# Patient Record
Sex: MALE | Race: WHITE | Employment: FULL TIME | URBAN - METROPOLITAN AREA
[De-identification: names, ages, dates, MRNs, and addresses within clinical notes are randomized per-mention and may not be internally consistent; named-entity substitution may affect disease eponyms.]

---

## 2017-01-19 DIAGNOSIS — N52.9 ED (ERECTILE DYSFUNCTION): ICD-10-CM

## 2017-01-20 RX ORDER — TADALAFIL 20 MG
TABLET ORAL
Qty: 18 TABLET | Refills: 1 | Status: SHIPPED | OUTPATIENT
Start: 2017-01-20 | End: 2018-06-11 | Stop reason: SDUPTHER

## 2017-01-20 RX ORDER — NAPROXEN 500 MG/1
TABLET ORAL
Qty: 60 TABLET | Refills: 1 | Status: SHIPPED | OUTPATIENT
Start: 2017-01-20

## 2017-02-02 ENCOUNTER — OFFICE VISIT (OUTPATIENT)
Dept: INTERNAL MEDICINE CLINIC | Age: 52
End: 2017-02-02

## 2017-02-02 VITALS
WEIGHT: 228 LBS | OXYGEN SATURATION: 99 % | SYSTOLIC BLOOD PRESSURE: 150 MMHG | DIASTOLIC BLOOD PRESSURE: 90 MMHG | BODY MASS INDEX: 30.08 KG/M2 | HEART RATE: 72 BPM

## 2017-02-02 DIAGNOSIS — E78.00 PURE HYPERCHOLESTEROLEMIA: ICD-10-CM

## 2017-02-02 DIAGNOSIS — I10 ESSENTIAL HYPERTENSION: Primary | ICD-10-CM

## 2017-02-02 PROCEDURE — 99213 OFFICE O/P EST LOW 20 MIN: CPT | Performed by: INTERNAL MEDICINE

## 2017-02-02 RX ORDER — SPIRONOLACTONE 50 MG/1
50 TABLET, FILM COATED ORAL DAILY
Qty: 90 TABLET | Refills: 2 | Status: SHIPPED | OUTPATIENT
Start: 2017-02-02 | End: 2018-01-31 | Stop reason: SDUPTHER

## 2017-02-02 RX ORDER — VALSARTAN AND HYDROCHLOROTHIAZIDE 320; 25 MG/1; MG/1
1 TABLET, FILM COATED ORAL DAILY
Qty: 90 TABLET | Refills: 3 | Status: SHIPPED | OUTPATIENT
Start: 2017-02-02 | End: 2018-06-11 | Stop reason: SDUPTHER

## 2017-02-02 RX ORDER — SIMVASTATIN 20 MG
20 TABLET ORAL EVERY EVENING
Qty: 90 TABLET | Refills: 3 | Status: SHIPPED | OUTPATIENT
Start: 2017-02-02 | End: 2018-06-11 | Stop reason: SDUPTHER

## 2017-02-02 RX ORDER — AMLODIPINE BESYLATE 10 MG/1
10 TABLET ORAL DAILY
Qty: 90 TABLET | Refills: 3 | Status: SHIPPED | OUTPATIENT
Start: 2017-02-02 | End: 2018-06-11 | Stop reason: SDUPTHER

## 2017-02-20 DIAGNOSIS — N52.9 ED (ERECTILE DYSFUNCTION): ICD-10-CM

## 2017-02-20 RX ORDER — TADALAFIL 20 MG
TABLET ORAL
Qty: 18 TABLET | Refills: 1 | Status: SHIPPED | OUTPATIENT
Start: 2017-02-20 | End: 2017-11-17 | Stop reason: SDUPTHER

## 2017-05-04 ENCOUNTER — OFFICE VISIT (OUTPATIENT)
Dept: INTERNAL MEDICINE CLINIC | Age: 52
End: 2017-05-04

## 2017-05-04 VITALS
HEART RATE: 63 BPM | SYSTOLIC BLOOD PRESSURE: 134 MMHG | WEIGHT: 244 LBS | OXYGEN SATURATION: 99 % | BODY MASS INDEX: 32.19 KG/M2 | DIASTOLIC BLOOD PRESSURE: 86 MMHG

## 2017-05-04 DIAGNOSIS — N52.02 CORPORO-VENOUS OCCLUSIVE ERECTILE DYSFUNCTION: ICD-10-CM

## 2017-05-04 DIAGNOSIS — E78.00 PURE HYPERCHOLESTEROLEMIA: ICD-10-CM

## 2017-05-04 DIAGNOSIS — I10 ESSENTIAL HYPERTENSION: Primary | ICD-10-CM

## 2017-05-04 PROCEDURE — 99214 OFFICE O/P EST MOD 30 MIN: CPT | Performed by: INTERNAL MEDICINE

## 2017-05-04 ASSESSMENT — ENCOUNTER SYMPTOMS
PHOTOPHOBIA: 0
EYE REDNESS: 0
CHOKING: 0
COUGH: 0

## 2017-09-07 ENCOUNTER — OFFICE VISIT (OUTPATIENT)
Dept: INTERNAL MEDICINE CLINIC | Age: 52
End: 2017-09-07

## 2017-09-07 VITALS
SYSTOLIC BLOOD PRESSURE: 134 MMHG | DIASTOLIC BLOOD PRESSURE: 86 MMHG | BODY MASS INDEX: 32.06 KG/M2 | WEIGHT: 243 LBS | OXYGEN SATURATION: 98 % | HEART RATE: 65 BPM

## 2017-09-07 DIAGNOSIS — Z12.5 SCREENING FOR PROSTATE CANCER: ICD-10-CM

## 2017-09-07 DIAGNOSIS — Z00.00 WELL ADULT EXAM: Primary | ICD-10-CM

## 2017-09-07 PROCEDURE — 99396 PREV VISIT EST AGE 40-64: CPT | Performed by: INTERNAL MEDICINE

## 2017-09-07 RX ORDER — AMOXICILLIN 875 MG/1
875 TABLET, COATED ORAL 2 TIMES DAILY
Qty: 20 TABLET | Refills: 0 | Status: SHIPPED | OUTPATIENT
Start: 2017-09-07 | End: 2017-09-07 | Stop reason: ALTCHOICE

## 2017-09-07 RX ORDER — AMOXICILLIN 875 MG/1
875 TABLET, COATED ORAL 2 TIMES DAILY
Qty: 20 TABLET | Refills: 0 | Status: SHIPPED | OUTPATIENT
Start: 2017-09-07 | End: 2017-09-17

## 2017-09-07 RX ORDER — AMOXICILLIN 875 MG/1
875 TABLET, COATED ORAL 2 TIMES DAILY
Qty: 20 TABLET | Refills: 0 | Status: SHIPPED | OUTPATIENT
Start: 2017-09-07 | End: 2017-09-07 | Stop reason: SDUPTHER

## 2017-09-11 DIAGNOSIS — I10 ESSENTIAL HYPERTENSION: ICD-10-CM

## 2017-09-11 DIAGNOSIS — Z12.5 SCREENING FOR PROSTATE CANCER: ICD-10-CM

## 2017-09-11 DIAGNOSIS — E78.00 PURE HYPERCHOLESTEROLEMIA: ICD-10-CM

## 2017-09-11 LAB
A/G RATIO: 1.8 (ref 1.1–2.2)
ALBUMIN SERPL-MCNC: 4.4 G/DL (ref 3.4–5)
ALP BLD-CCNC: 83 U/L (ref 40–129)
ALT SERPL-CCNC: 30 U/L (ref 10–40)
ANION GAP SERPL CALCULATED.3IONS-SCNC: 16 MMOL/L (ref 3–16)
AST SERPL-CCNC: 28 U/L (ref 15–37)
BILIRUB SERPL-MCNC: 0.5 MG/DL (ref 0–1)
BUN BLDV-MCNC: 18 MG/DL (ref 7–20)
CALCIUM SERPL-MCNC: 9.5 MG/DL (ref 8.3–10.6)
CHLORIDE BLD-SCNC: 101 MMOL/L (ref 99–110)
CHOLESTEROL, TOTAL: 172 MG/DL (ref 0–199)
CO2: 23 MMOL/L (ref 21–32)
CREAT SERPL-MCNC: 0.8 MG/DL (ref 0.9–1.3)
GFR AFRICAN AMERICAN: >60
GFR NON-AFRICAN AMERICAN: >60
GLOBULIN: 2.5 G/DL
GLUCOSE BLD-MCNC: 104 MG/DL (ref 70–99)
HDLC SERPL-MCNC: 45 MG/DL (ref 40–60)
LDL CHOLESTEROL CALCULATED: 108 MG/DL
POTASSIUM SERPL-SCNC: 4.2 MMOL/L (ref 3.5–5.1)
PROSTATE SPECIFIC ANTIGEN: 1.37 NG/ML (ref 0–4)
SODIUM BLD-SCNC: 140 MMOL/L (ref 136–145)
TOTAL PROTEIN: 6.9 G/DL (ref 6.4–8.2)
TRIGL SERPL-MCNC: 94 MG/DL (ref 0–150)
VLDLC SERPL CALC-MCNC: 19 MG/DL

## 2017-11-17 DIAGNOSIS — N52.9 ED (ERECTILE DYSFUNCTION): ICD-10-CM

## 2017-11-20 RX ORDER — TADALAFIL 20 MG
TABLET ORAL
Qty: 18 TABLET | Refills: 1 | Status: SHIPPED | OUTPATIENT
Start: 2017-11-20

## 2018-01-31 DIAGNOSIS — I10 ESSENTIAL HYPERTENSION: ICD-10-CM

## 2018-02-01 RX ORDER — SPIRONOLACTONE 50 MG/1
TABLET, FILM COATED ORAL
Qty: 90 TABLET | Refills: 2 | Status: SHIPPED | OUTPATIENT
Start: 2018-02-01 | End: 2018-06-11 | Stop reason: SDUPTHER

## 2018-06-11 DIAGNOSIS — N52.9 ED (ERECTILE DYSFUNCTION): ICD-10-CM

## 2018-06-11 DIAGNOSIS — I10 ESSENTIAL HYPERTENSION: ICD-10-CM

## 2018-06-11 DIAGNOSIS — E78.00 PURE HYPERCHOLESTEROLEMIA: ICD-10-CM

## 2018-06-11 RX ORDER — AMLODIPINE BESYLATE 10 MG/1
10 TABLET ORAL DAILY
Qty: 90 TABLET | Refills: 0 | Status: SHIPPED | OUTPATIENT
Start: 2018-06-11 | End: 2018-08-10 | Stop reason: SDUPTHER

## 2018-06-11 RX ORDER — VALSARTAN AND HYDROCHLOROTHIAZIDE 320; 25 MG/1; MG/1
1 TABLET, FILM COATED ORAL DAILY
Qty: 90 TABLET | Refills: 0 | Status: SHIPPED | OUTPATIENT
Start: 2018-06-11 | End: 2018-08-10 | Stop reason: SDUPTHER

## 2018-06-11 RX ORDER — SPIRONOLACTONE 50 MG/1
50 TABLET, FILM COATED ORAL DAILY
Qty: 90 TABLET | Refills: 2 | Status: SHIPPED | OUTPATIENT
Start: 2018-06-11

## 2018-06-11 RX ORDER — SIMVASTATIN 20 MG
20 TABLET ORAL EVERY EVENING
Qty: 90 TABLET | Refills: 0 | Status: SHIPPED | OUTPATIENT
Start: 2018-06-11 | End: 2018-08-20 | Stop reason: SDUPTHER

## 2018-06-11 RX ORDER — TADALAFIL 20 MG/1
20 TABLET ORAL PRN
Qty: 9 TABLET | Refills: 0 | Status: SHIPPED | OUTPATIENT
Start: 2018-06-11 | End: 2018-08-10 | Stop reason: SDUPTHER

## 2018-07-12 ENCOUNTER — OFFICE VISIT (OUTPATIENT)
Dept: INTERNAL MEDICINE CLINIC | Age: 53
End: 2018-07-12

## 2018-07-12 VITALS
HEART RATE: 67 BPM | BODY MASS INDEX: 32.85 KG/M2 | OXYGEN SATURATION: 97 % | DIASTOLIC BLOOD PRESSURE: 80 MMHG | TEMPERATURE: 98.2 F | SYSTOLIC BLOOD PRESSURE: 138 MMHG | WEIGHT: 249 LBS

## 2018-07-12 DIAGNOSIS — J30.89 ENVIRONMENTAL AND SEASONAL ALLERGIES: Primary | ICD-10-CM

## 2018-07-12 PROCEDURE — 99213 OFFICE O/P EST LOW 20 MIN: CPT | Performed by: NURSE PRACTITIONER

## 2018-07-12 RX ORDER — LEVOCETIRIZINE DIHYDROCHLORIDE 5 MG/1
5 TABLET, FILM COATED ORAL NIGHTLY
Qty: 30 TABLET | Refills: 1 | Status: SHIPPED | OUTPATIENT
Start: 2018-07-12 | End: 2018-09-29 | Stop reason: SDUPTHER

## 2018-07-12 ASSESSMENT — PATIENT HEALTH QUESTIONNAIRE - PHQ9
1. LITTLE INTEREST OR PLEASURE IN DOING THINGS: 0
2. FEELING DOWN, DEPRESSED OR HOPELESS: 0
SUM OF ALL RESPONSES TO PHQ QUESTIONS 1-9: 0
SUM OF ALL RESPONSES TO PHQ9 QUESTIONS 1 & 2: 0

## 2018-07-12 ASSESSMENT — ENCOUNTER SYMPTOMS
SHORTNESS OF BREATH: 1
COUGH: 1

## 2018-07-12 NOTE — PATIENT INSTRUCTIONS
Continue to drink plenty of water  Inhale hot steam for 5 minutes then gargle with warm salt and water  Take allergy pill at night  Vacuum and dust daily wear hardwood floors have been

## 2018-08-10 DIAGNOSIS — N52.9 ED (ERECTILE DYSFUNCTION): ICD-10-CM

## 2018-08-10 RX ORDER — AMLODIPINE BESYLATE 10 MG/1
TABLET ORAL
Qty: 90 TABLET | Refills: 0 | Status: SHIPPED | OUTPATIENT
Start: 2018-08-10 | End: 2018-10-30 | Stop reason: SDUPTHER

## 2018-08-10 RX ORDER — VALSARTAN AND HYDROCHLOROTHIAZIDE 320; 25 MG/1; MG/1
TABLET, FILM COATED ORAL
Qty: 90 TABLET | Refills: 0 | Status: SHIPPED | OUTPATIENT
Start: 2018-08-10

## 2018-08-10 RX ORDER — TADALAFIL 20 MG
TABLET ORAL
Qty: 9 TABLET | Refills: 0 | Status: SHIPPED | OUTPATIENT
Start: 2018-08-10 | End: 2020-03-05 | Stop reason: SDUPTHER

## 2018-08-20 ENCOUNTER — TELEPHONE (OUTPATIENT)
Dept: INTERNAL MEDICINE CLINIC | Age: 53
End: 2018-08-20

## 2018-08-20 DIAGNOSIS — E78.00 PURE HYPERCHOLESTEROLEMIA: ICD-10-CM

## 2018-08-20 RX ORDER — SIMVASTATIN 20 MG
20 TABLET ORAL EVERY EVENING
Qty: 90 TABLET | Refills: 0 | Status: SHIPPED | OUTPATIENT
Start: 2018-08-20 | End: 2020-03-05

## 2018-08-20 NOTE — TELEPHONE ENCOUNTER
I sent a prescription to pharmacy. Patient does need an appointment as he has not been seen in almost a year.

## 2018-09-29 DIAGNOSIS — J30.89 ENVIRONMENTAL AND SEASONAL ALLERGIES: ICD-10-CM

## 2018-10-01 NOTE — TELEPHONE ENCOUNTER
Pharmacy asking for a refill    Last office visit 7/2018 for acute, 9/2017 for f/u    Next office visit 11/12/18

## 2018-10-02 RX ORDER — LEVOCETIRIZINE DIHYDROCHLORIDE 5 MG/1
TABLET, FILM COATED ORAL
Qty: 30 TABLET | Refills: 1 | Status: SHIPPED | OUTPATIENT
Start: 2018-10-02 | End: 2020-03-05

## 2018-10-03 ENCOUNTER — TELEPHONE (OUTPATIENT)
Dept: INTERNAL MEDICINE CLINIC | Age: 53
End: 2018-10-03

## 2018-10-31 RX ORDER — AMLODIPINE BESYLATE 10 MG/1
10 TABLET ORAL DAILY
Qty: 90 TABLET | Refills: 3 | Status: SHIPPED | OUTPATIENT
Start: 2018-10-31

## 2020-03-05 ENCOUNTER — OFFICE VISIT (OUTPATIENT)
Dept: ORTHOPEDIC SURGERY | Age: 55
End: 2020-03-05
Payer: COMMERCIAL

## 2020-03-05 VITALS
HEART RATE: 60 BPM | WEIGHT: 230 LBS | HEIGHT: 73 IN | SYSTOLIC BLOOD PRESSURE: 156 MMHG | DIASTOLIC BLOOD PRESSURE: 94 MMHG | BODY MASS INDEX: 30.48 KG/M2

## 2020-03-05 PROCEDURE — 99203 OFFICE O/P NEW LOW 30 MIN: CPT | Performed by: PHYSICIAN ASSISTANT

## 2020-03-05 RX ORDER — ROPINIROLE 0.5 MG/1
TABLET, FILM COATED ORAL
COMMUNITY
Start: 2019-12-30

## 2020-03-05 RX ORDER — NABUMETONE 750 MG/1
750 TABLET, FILM COATED ORAL 2 TIMES DAILY
COMMUNITY

## 2020-03-05 NOTE — PROGRESS NOTES
New Patient: SPINE    Referring Provider:  No ref. provider found    Chief Complaint   Patient presents with    Back Pain     NP, TSP       HISTORY OF PRESENT ILLNESS:      · The patient is being sent at the request of No ref. provider found in consultation as a new spine patient for mid-back pain. . The patient is a 63 y.o. male whom reports symptoms for 3 years. Symptoms progressed over the last  6 months. Patient reports there was not a significant event to cause the symptoms. Today discomfort is report at 7 out of 10, describing it as dull, aching, tingling. 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 Patient has undergone recent treatment including, ice, chiropractor a few years ago. Patient denies previous lumbar or cervical spine surgery. · Patient presents today with increased mid back pain throughout his entire mid back. The patient does have occasional lower back and left hip and leg pain which she has been dealing with for a number of years. This mid back pain is been progressively worsening over the last 6 months. Patient describes his pain is a 7/10 in severity. The patient has attempted using ice and some over-the-counter medications which are not helping. He has seen a chiropractor a number of years ago but he is not had any formal physical therapy. The patient did have injections in his lower back 4 years ago and he is not having the intense \"sciatic\" type pain that he was having at this time the mid back is much more severe.     Pain Assessment  Location of Pain: Back(TSP)  Location Modifiers: Posterior  Severity of Pain: 8  Quality of Pain: Sharp, Other (Comment)(Tingling)  Duration of Pain: Persistent  Frequency of Pain: Constant  Aggravating Factors: Walking, Standing, Stretching, Straightening, Other (Comment)(Tending to farm animals)  Limiting Behavior: Yes  Relieving Factors: Rest  Result of Injury: No  Work-Related Injury: No  Are there other pain 1 TABLET DAILY        SCHEDULE AN APPOINTMENT FORFUTURE REFILLS, Disp: 90 tablet, Rfl: 0    spironolactone (ALDACTONE) 50 MG tablet, Take 1 tablet by mouth daily Pt to schedule an appt for future refills, Disp: 90 tablet, Rfl: 2    CIALIS 20 MG tablet, TAKE 1 TABLET AS NEEDED FORERECTILE DYSFUNCTION, Disp: 18 tablet, Rfl: 1    naproxen (NAPROSYN) 500 MG tablet, TAKE 1 TABLET TWICE A DAY  AS NEEDED FOR PAIN, Disp: 60 tablet, Rfl: 1    aspirin 81 MG EC tablet, Take 81 mg by mouth daily. , Disp: , Rfl:   Allergies:  Patient has no known allergies. Social History:    reports that he has never smoked. He has never used smokeless tobacco. He reports current alcohol use of about 0.8 standard drinks of alcohol per week. He reports that he does not use drugs. Family History:   Family History   Problem Relation Age of Onset    Diabetes Mother     High Cholesterol Mother          REVIEW OF SYSTEMS: Full ROS reviewed & scanned from 3/5/2020           PHYSICAL EXAM:    Vitals: Blood pressure (!) 156/94, pulse 60, height 6' 1\" (1.854 m), weight 230 lb (104.3 kg). GENERAL EXAM:  · General Apparence: Patient is adequately groomed with no evidence of malnutrition. · Psychiatric: Orientation: The patient is oriented to time, place and person. The patient's mood and affect are appropriate   · Vascular: Examination reveals no swelling and palpation reveals no tenderness in upper or lower extremities. Good capillary refill. · The lymphatic examination of the neck, axillae and groin reveals all areas to be without enlargement or induration   Sensation is intact without deficit in the upper and lower extremities to light touch and pinprick  · Coordination of the upper and lower extremities are normal.    CERVICAL EXAMINATION:  · Inspection: Local inspection shows no step-off or bruising. Cervical alignment is normal. No instability is noted. · Palpation and Percussion: No evidence of tenderness at the midline.   Paraspinal - 199 mg/dL    Triglycerides 94 0 - 150 mg/dL    HDL 45 40 - 60 mg/dL    LDL Calculated 108 (H) <100 mg/dL    VLDL Cholesterol Calculated 19 Not Established mg/dL   PSA PROSTATIC SPECIFIC ANTIGEN   Result Value Ref Range    PSA 1.37 0.00 - 4.00 ng/mL       Impression (Medical Decision Making):       1. Spondylosis of thoracic region without myelopathy or radiculopathy    2. DDD (degenerative disc disease), thoracic    3. Mid back pain        Plan (Medical Decision Making):    I discussed the diagnosis and the treatment options with Fred Burks today. In Summary:  The various treatment options were outlined and discussed with Fred Burks including:  Conservative care options: physical therapy, ice, medications, bracing, and activity modification. The indications for therapeutic injections. The indications for additional imaging/laboratory studies. The indications for (possible future) interventions. After considering the various options discussed, Fred Burks elected to pursue a course of treatment that includes the followin. Medications: No further recommendations for new medications. 2. PT:  I will start the patient on a trial of PT to work on a thoracic and lumbar stabilization program to focus on core strengthening, core stabilizing, lumbar stretches, hamstring flexibility, modalities as indicated for 6-8 visits over the next 4-6 weeks. 3. Further studies: No further studies. If the patient does not improve with PT, we will proceed with a MRI of the thoracic spine. 4. Interventional:  At this point, no interventional options are recommended.     5. Healthy Lifestyle Measures:  Patient education material reviewing the following was distributed to [place] [facility]mie  Anatomic drawings  Healthy lifestyle education  Osteoporosis prevention,   Back and neck pain educational information   Advanced imaging preparedness    Posture education   Proper lifting and carrying techniques,   Weight

## 2020-03-05 NOTE — LETTER
PRESCRIPTION FOR PHYSICAL THERAPY  Patient Name: Machelle Barbour MRN: [id number]  DOS: 3/5/2020   Diagnosis:   1. Spondylosis of thoracic region without myelopathy or radiculopathy    2. DDD (degenerative disc disease), thoracic    3. Mid back pain                           Goal:  [x]Decrease Pain and/or Swelling [x]Increase ROM and/or Flexibility     [x]Increase Function                        [x]Increase Strength and/or Endurance   Evaluation:  [x]Evaluation and Treatment []KT-1000   []Isokinetic Exam       Recommended Modalities:  [x]Modalities of Choice      []HCVS            [x]Electrical Stimulation       []Ultrasound        []TENS/TNS    [] Lumbar Traction           [] Cervical Traction   []Phonophoresis         [x]Hot Pack/Cold Pack  []Other:  Therapeutic Exercises:    []Isometrics    [x]Range of Motion   [x]Balance Coordination   []Flexibility    [x]Back Extension/Flexion Exercises  []Passive     [x]Dynamic Lumbar Stabilization  []Active Assisted    [x]Myofascial Release/Soft Tissue  [x]Active            [x]Spine Program [x] Gait                                                                   [x] Extension   [] Cervical Eval        [x] Stabilization  [x] Lumbar           [x] Spine Eval   [x] Lumbar Exer.   [] Stationary Bike   [] Functional Cap    [] Aquatic Prog.   [] Return to work     Treatment Program:  [x] Other: [facility] [address] (6) VISITS - Patient will need to follow up in the office after visit #6    *[address] (I.e.: [facility], [facility], etc.)           ARIEL Brice, PARhinaC  Board Certified by the [employer] on Certification of Physician Assistants  Lupe Maddox 57  Partner of [employer]

## 2020-05-26 ENCOUNTER — HOSPITAL ENCOUNTER (OUTPATIENT)
Dept: PHYSICAL THERAPY | Age: 55
Setting detail: THERAPIES SERIES
Discharge: HOME | End: 2020-05-26
Payer: COMMERCIAL

## 2020-05-26 PROCEDURE — 97161 PT EVAL LOW COMPLEX 20 MIN: CPT

## 2020-05-26 PROCEDURE — 97110 THERAPEUTIC EXERCISES: CPT

## 2020-05-26 PROCEDURE — 97530 THERAPEUTIC ACTIVITIES: CPT

## 2020-05-26 NOTE — FLOWSHEET NOTE
in  [] LE / lumbar: LE, proximal hip, and core control in self care, mobility, lifting, ambulation and eccentric single leg control. [] UE / cervical: cervical, scapular, scapulothoracic and UE control with self care, reaching, carrying, lifting, house/yardwork, driving, computer work. NMR and Therapeutic Activities:    [] (13374 or 59349) Provided verbal/tactile cueing for activities related to improving balance, coordination, kinesthetic sense, posture, motor skill, proprioception and motor activation to allow for proper function of   [] LE: / Lumbar core, proximal hip and LE with self care and ADLs  [] UE / Cervical: cervical, postural, scapular, scapulothoracic and UE control with self care, carrying, lifting, driving, computer work.   [] (07344) Gait Re-education- Provided training and instruction to the patient for proper LE, core and proximal hip recruitment and positioning and eccentric body weight control with ambulation re-education including up and down stairs     Home Management Training / Self Care:  [] (99927) Provided self-care/home management training related to activities of daily living and compensatory training, and/or use of adaptive equipment for improvement with: ADLs and compensatory training, meal preparation, safety procedures and instruction in use of adaptive equipment, including bathing, grooming, dressing, personal hygiene, basic household cleaning and chores.      Home Exercise Program:    [x] (55345) Reviewed/Progressed HEP activities related to strengthening, flexibility, endurance, ROM of   [] LE / Lumbar: core, proximal hip and LE for functional self-care, mobility, lifting and ambulation/stair navigation   [] UE / Cervical: cervical, postural, scapular, scapulothoracic and UE control with self care, reaching, carrying, lifting, house/yardwork, driving, computer work  [] (71212)Reviewed/Progressed HEP activities related to improving balance, coordination, kinesthetic sense, To be achieved in: 2 weeks  1. Independent in HEP and progression per patient tolerance, in order to prevent re-injury. []? Progressing: []? Met: []? Not Met: []? Adjusted  2. Patient will have a decrease in pain to facilitate improvement in movement, function, and ADLs as indicated by Functional Deficits. []? Progressing: []? Met: []? Not Met: []? Adjusted     Long Term Goals: To be achieved in:  weeks  1. Disability index score of 20% or less for the DENAE to assist with reaching prior level of function. []? Progressing: []? Met: []? Not Met: []? Adjusted  2. Patient will demonstrate increased AROM to WNL, good LS mobility, good hip ROM to allow for proper joint functioning as indicated by patients Functional Deficits. []? Progressing: []? Met: []? Not Met: []? Adjusted  3. Patient will demonstrate an increase in Strength to 4+/5 hip good proximal hip and core activation to allow for proper functional mobility as indicated by patients Functional Deficits. []? Progressing: []? Met: []? Not Met: []? Adjusted  4. 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 []? Progressing: []? Met: []? Not Met: []? Adjusted  5. Pt will demonstrate ability to ambulate > 30 mins continuously without onset of symptoms    []? Progressing: []? Met: []? Not Met: []? Adjusted      Overall Progression Towards Functional goals/ Treatment Progress Update:  [] Patient is progressing as expected towards functional goals listed. [] Progression is slowed due to complexities/Impairments listed. [] Progression has been slowed due to co-morbidities.   [x] Plan just implemented, too soon to assess goals progression <30days   [] Goals require adjustment due to lack of progress  [] Patient is not progressing as expected and requires additional follow up with physician  [] Other    Persisting Functional

## 2020-05-26 NOTE — PLAN OF CARE
[address] [address], [address]  Phone: [phone]   Fax: [phone]     Physical Therapy Certification    Dear Referring Practitioner: MILAGROS Perales,    We had the pleasure of evaluating the following patient for physical therapy services at [address] [place] [address]. A summary of our findings can be found in the initial assessment below. This includes our plan of care. If you have any questions or concerns regarding these findings, please do not hesitate to contact me at the office phone number checked above. Thank you for the referral.       Physician Signature:_______________________________Date:__________________  By signing above (or electronic signature), therapists plan is approved by physician      Patient: Alan Check   : 1965   MRN: [id number]  Referring Physician: Referring Practitioner: MILAGORS Perales      Evaluation Date: 2020      Medical Diagnosis Information:  Diagnosis: Thoracic Spondylosis, Thoracic DDD, Mid back pain    Treatment Diagnosis: Impaired posture, decreased thoracic stability, decreased left hip strength/flexibility, sciatic nerve irritation                                         Insurance information: PT Insurance Information: [place] 60 visits/year     Precautions/ Contra-indications:   Latex Allergy:  [x]NO      []YES  Preferred Language for Healthcare:   [x]English       []other:    SUBJECTIVE: Patient stated complaint:Pt referred for mid back pain. Also has c/o pain in left hip. Symptoms for 3 years. Pain 7/10, dull, aching, tingling. Aggravated by movement, walking, work ( and small farm). Also has occasional lower back and left hip and leg pain which he has been dealing with for a number of years. Worsening over the last 6 months. Had chiropractic care a number of years ago, no formal PT. Had injections in low back 4 years ago.   Says pain in between shoulder blades will get so bad it will almost take his hip away. 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 Avoids real strenous activities. Is still able to carry a 50# feed bag. Pt has tried exercise, and says if he rides his horse his sciatic pain goes away. MD wants 6 visits only, then needs to follow back up with MD     Fear avoidance: I should not do physical activities that (might) make my pain worse   [] True   [x] False     Relevant Medical History: HTN, Sleep Apnea   Functional Outcome: Oswestry: raw score = 12; dysfunction = 24%    Pain Scale: 2-8/10  Easing factors: Sitting in comfortable chair with feet elevated, lying on floor. Provocative factors:  Repetitive activities    Type: [x]Constant   []Intermittent  [x]Radiating []Localized []other:     Numbness/Tingling: occasional in the left leg, but very infrequent     Occupation/School:      Living Status/Prior Level of Function: Prior to this injury / incident, pt was independent with ADLs and IADLs, enjoys horseback riding. OBJECTIVE:   Palpation: positive TTP with P/A glides T8-L2    Functional Mobility/Transfers:     Posture:      Inspection:     Gait: (include devices/WB status)     Bandages/Dressings/Incisions: NA        Reflexes Normal Abnormal Comments   S1-2 Seated achilles      S1-2 Prone knee bend      L3-4 Patellar tendon      Clonus      Babinski          ROM  Comments   Lumbar Flex 60 With pain    Lumbar Ext WNL      ROM LEFT RIGHT Comments   Lumbar Side Bend 75% 75% With tightness/discomfort    Lumbar Rotation [facility] WFL    Hip Flexion [facility] WFL    Hip Abd [facility]    Hip ER [facility]    Hip IR      Hip Extension      Knee Ext      Knee Flex      Hamstring Flex      Piriformis      Cervical Flex      Cervical Ext      Cervical SB      Cervical Rotation       Shoulder    WFL/WNL but pain with end range flexion and ER in lower scapula B        Joint mobility:  []Normal    [x]Hypo   []Hyper      Strength / Myotomes LEFT RIGHT Comments   Multifidus      Lower Trap 4 4    Hip Flexors (L1-2)      Hip Abductors 4+ 4+    Hip Extensors 4 with pain     Hip Internal Rotators 4+ 4+    Hip External Rotators 4+ 4+    Quads (L2-4) 5 5    Hamstrings  5 5    Ankle Plantarflexion (S1-2) 5 5    Ankle Dorsiflexion (L4-5) 5 5    Shoulder Flex 5 5    Shoulder Abd 5 5    Shoulder ER 5 5    Shoulder IR 5 5    Biceps 5 5    Triceps  5 5            Neural dynamic tension testing Normal Abnormal Comments   Slump Test  - Degree of knee flexion:  x     SLR       0-30  x    30-70  x    Femoral nerve (L2-4)          Orthopedic Special Tests:    Normal Abnormal N/A Comments   Toe walk   x      Heel Walk x      Fwd Bend-aberrant or innominate mvmt)  x     Trendelenburg x      Kemps/Quadrant x      Stork       DONELL/Polo x      Hip scour       SLR  x     Crossed SLR  x     Supine to sit       Hip thrust       SI distraction/compression       PA/Spring       Prone Instability test       Prone knee bend       Sacral Spring/thrust                  [x] Patient history, allergies, meds reviewed. Medical chart reviewed. See intake form. Review Of Systems (ROS):  [x]Performed Review of systems (Integumentary, CardioPulmonary, Neurological) by intake and observation. Intake form has been scanned into medical record. Patient has been instructed to contact their primary care physician regarding ROS issues if not already being addressed at this time.       Co-morbidities/Complexities (which will affect course of rehabilitation):   [x]None           Arthritic conditions   []Rheumatoid arthritis (M05.9)  [x]Osteoarthritis (M19.91)   Cardiovascular conditions   []Hypertension (I10)  []Hyperlipidemia (E78.5)  []Angina pectoris (I20)  []Atherosclerosis (I70)   Musculoskeletal care  []3 personal factors and/or comorbidities that impact the plan of care  [x] An examination of body systems using standardized tests and measures addressing any of the following: body structures and functions (impairments), activity limitations, and/or participation restrictions;:  [x] a total of 1-2 or more elements   [] a total of 3 or more elements   [] a total of 4 or more elements   [x] A clinical presentation with:  [x] stable and/or uncomplicated characteristics   [] evolving clinical presentation with changing characteristics  [] unstable and unpredictable characteristics;   [x] Clinical decision making of [] low, [] moderate, [] high complexity using standardized patient assessment instrument and/or measurable assessment of functional outcome. [x] EVAL (LOW) 88567 (typically 15 minutes face-to-face)  [] EVAL (MOD) 15464 (typically 30 minutes face-to-face)  [] EVAL (HIGH) 83474 (typically 45 minutes face-to-face)  [] RE-EVAL     PLAN: Begin PT focusing on: proximal hip mobilizations, LB mobs, LB core activation, proximal hip activation, and HEP    Frequency/Duration:   1 days per week for 5 Weeks:  Interventions:  [x]  Therapeutic exercise including: strength training, ROM, for LE, Glutes and core   [x]  NMR activation and proprioception for glutes , LE and Core   [x]  Manual therapy as indicated for Hip complex, LE and spine to include: Dry Needling/IASTM, STM, PROM, Gr I-IV mobilizations, manipulation. [x]  Modalities as needed that may include: thermal agents, E-stim, Biofeedback, US, iontophoresis as indicated  [x]  Patient education on joint protection, postural re-education, activity modification, progression of HEP. HEP instruction: Written HEP instructions provided and reviewed:      Access Code: [email or web address]   URL: [email or web address]   Date: 05/26/2020   Prepared by: Olga Marin     Exercises   Static Prone on Elbows - 2-5 mins hold - 2x daily - 7x weekly   Prone Press Up - 10 reps - 2-3 sets - 2x daily - 7x weekly   Supine Piriformis Stretch with Foot on Ground - 5 reps - 20-30 secs hold - 2x daily - 7x weekly   Supine Mid-Thoracic Mobilization - 25 mins hold - 2x daily - 7x weekly   Standing Shoulder Row with Anchored Resistance - 10 reps - 2-3 sets - 2x daily - 7x weekly   Standing High Row with Resistance - 10 reps - 2-3 sets - 2x daily - 7x weekly       GOALS:  Patient stated goal: reduce pain, increase activity level   [] Progressing: [] Met: [] Not Met: [] Adjusted    Therapist goals for Patient:   Short Term Goals: To be achieved in: 2 weeks  1. Independent in HEP and progression per patient tolerance, in order to prevent re-injury. [] Progressing: [] Met: [] Not Met: [] Adjusted  2. Patient will have a decrease in pain to facilitate improvement in movement, function, and ADLs as indicated by Functional Deficits. [] Progressing: [] Met: [] Not Met: [] Adjusted    Long Term Goals: To be achieved in:  weeks  1. Disability index score of 20% or less for the DENAE to assist with reaching prior level of function. [] Progressing: [] Met: [] Not Met: [] Adjusted  2. Patient will demonstrate increased AROM to WNL, good LS mobility, good hip ROM to allow for proper joint functioning as indicated by patients Functional Deficits. [] Progressing: [] Met: [] Not Met: [] Adjusted  3. Patient will demonstrate an increase in Strength to 4+/5 hip good proximal hip and core activation to allow for proper functional mobility as indicated by patients Functional Deficits. [] Progressing: [] Met: [] Not Met: [] Adjusted  4. 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 [] Progressing: [] Met: [] Not Met: [] Adjusted  5.  Pt will demonstrate ability to ambulate > 30 mins continuously without onset of symptoms    [] Progressing: [] Met: [] Not Met: []

## 2020-06-01 ENCOUNTER — HOSPITAL ENCOUNTER (OUTPATIENT)
Dept: PHYSICAL THERAPY | Age: 55
Setting detail: THERAPIES SERIES
Discharge: HOME | End: 2020-06-01
Payer: COMMERCIAL

## 2020-06-01 PROCEDURE — 97112 NEUROMUSCULAR REEDUCATION: CPT

## 2020-06-01 PROCEDURE — 97110 THERAPEUTIC EXERCISES: CPT

## 2020-06-01 PROCEDURE — G0283 ELEC STIM OTHER THAN WOUND: HCPCS

## 2020-06-01 PROCEDURE — 97140 MANUAL THERAPY 1/> REGIONS: CPT

## 2020-06-01 NOTE — FLOWSHEET NOTE
RE-EVAL (10813)  [x] LC(32280) x 1      [] Ionto  [x] NMR (55037) x 1      [] Vaso  [x] Manual (68776) x  1     [] Ultrasound  [] TA x  2      [] Mech Traction (81790)  [] Aquatic Therapy x      [x] ES (un) (37028):   [] Home Management Training x  [] ES(attended) (81979)   [] Dry Needling 1-2 muscles (78350):  [] Dry Needling 3+ muscles (965926)  [] Group:      [] Other:     GOALS: Patient stated goal: reduce pain, increase activity level   []? Progressing: []? Met: []? Not Met: []? Adjusted     Therapist goals for Patient:   Short Term Goals: To be achieved in: 2 weeks  1. Independent in HEP and progression per patient tolerance, in order to prevent re-injury. []? Progressing: []? Met: []? Not Met: []? Adjusted  2. Patient will have a decrease in pain to facilitate improvement in movement, function, and ADLs as indicated by Functional Deficits. []? Progressing: []? Met: []? Not Met: []? Adjusted     Long Term Goals: To be achieved in:  weeks  1. Disability index score of 20% or less for the DENAE to assist with reaching prior level of function. []? Progressing: []? Met: []? Not Met: []? Adjusted  2. Patient will demonstrate increased AROM to WNL, good LS mobility, good hip ROM to allow for proper joint functioning as indicated by patients Functional Deficits. []? Progressing: []? Met: []? Not Met: []? Adjusted  3. Patient will demonstrate an increase in Strength to 4+/5 hip good proximal hip and core activation to allow for proper functional mobility as indicated by patients Functional Deficits. []? Progressing: []? Met: []? Not Met: []? Adjusted  4. 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 []? Progressing: []? Met: []? Not Met: []? Adjusted  5. Pt will demonstrate ability to ambulate > 30 mins continuously without onset of symptoms    []? Progressing: []? Met: []?  Not Met:

## 2020-06-09 ENCOUNTER — HOSPITAL ENCOUNTER (OUTPATIENT)
Dept: PHYSICAL THERAPY | Age: 55
Setting detail: THERAPIES SERIES
Discharge: HOME | End: 2020-06-09
Payer: COMMERCIAL

## 2020-06-09 PROCEDURE — 97112 NEUROMUSCULAR REEDUCATION: CPT

## 2020-06-09 PROCEDURE — 97110 THERAPEUTIC EXERCISES: CPT

## 2020-06-09 PROCEDURE — 97140 MANUAL THERAPY 1/> REGIONS: CPT

## 2020-06-09 NOTE — FLOWSHEET NOTE
[address] [facility] Physical Therapy  Phone: [phone]   Fax: [phone]    Physical Therapy Daily Treatment Note  Date:  2020    Patient Name:  Merline Duran    :  1965  MRN: [id number]  Medical/Treatment Diagnosis Information:  · Diagnosis: Thoracic Spondylosis, Thoracic DDD, Mid back pain   · Treatment Diagnosis: Impaired posture, decreased thoracic stability, decreased left hip strength/flexibility, sciatic nerve irritation  Insurance/Certification information:  PT Insurance Information: [place] 60 visits/year  Physician Information:  Referring Practitioner: MILAGROS Negro  Plan of care signed (Y/N): []  Yes [x]  No     Date of Patient follow up with Physician:      Progress Report: []  Yes  [x]  No     Date Range for reporting period:  Beginnin/26    Ending:     Progress report due (10 Rx/or 30 days whichever is less): visit #60     Recertification due (POC duration/ or 90 days whichever is less):     Visit # Insurance Allowable Auth required? Date Range    61, but after 6 needs f/u with MD per MD order  []  Yes  [x]  No      Latex Allergy:  [x]NO      []YES  Preferred Language for Healthcare:   [x]English       []other:    Functional Scale:        Date assessed:  LEFS: raw score = 12/80; dysfunction = 24%       Pain level:    3/10 middle back    SUBJECTIVE:   Feel about the same overall, the bad pain doesn't seem to be as much, be less bothersome at times, but still there      OBJECTIVE:   .   Pt 16 mins late for appt, called early in day to state he worked till 3 pm as  at [employer] and his appt was at 3 pm, so was aware ahead of time he would be late       RESTRICTIONS/PRECAUTIONS:     Exercises/Interventions:   Therapeutic Exercises (25283) Resistance / level Sets/sec Reps Notes   UBE - Fwd/bwd 1.0 2 min/2 min     Mid Row  High Row  LPD  Horiz Abd  PNF D2 pattern Blue  Blue  Blue  Blue  Green 2  2  2 10  15  15  15  15 B Max verbal & manual cues for posture and technique w/ea exercise   Standing at wall  · Postural awareness    x30\"     Total Gym  · Squats w/BUE ball lift   Red ball      15    Prone Hughstons over SB  · T's, Rows, Y's, GH ext   2#    15    Piriformis stretch       ALLIE  Prone PressUp     15           Quadruped:  · Shoulder flex  · Hip ext   2#      15 B  15 B   Verbal & manual cues to improve scapular posture          TRX Mid Rows   X 15           Therapeutic Activities (12563)                                          Neuromuscular Re-ed (98865)       Gliders       Lateral Band Walk        Swiss Ball Core/Shoulder strengthening        Body Blade Flexion BUE  15\"  X 2                   Manual Intervention (91842)       P/A Glides throughout lumbar/thoracic spine       T4-T10 grade III P/A mobs  · ALLIE   · Prone    3 mins  3 mins                                     Pt. Education:      Home Exercise Program:  5/26:  Access Code: [email or web address]   URL: [email or web address]   Date: 05/26/2020   Prepared by: Gurjit Pinedo     Exercises   Static Prone on Elbows - 2-5 mins hold - 2x daily - 7x weekly   Prone Press Up - 10 reps - 2-3 sets - 2x daily - 7x weekly   Supine Piriformis Stretch with Foot on Ground - 5 reps - 20-30 secs hold - 2x daily - 7x weekly   Supine Mid-Thoracic Mobilization - 25 mins hold - 2x daily - 7x weekly   Standing Shoulder Row with Anchored Resistance - 10 reps - 2-3 sets - 2x daily - 7x weekly   Standing High Row with Resistance - 10 reps - 2-3 sets - 2x daily - 7x weekly         Therapeutic Exercise and NMR EXR  [x] (26389) Provided verbal/tactile cueing for activities related to strengthening, flexibility, endurance, ROM for improvements in  [x] LE / Lumbar: LE, proximal hip, and core control with self care, mobility, lifting, ambulation. [x] UE / Cervical: cervical, postural, scapular, scapulothoracic and UE control with self care, reaching, carrying, lifting, house/yardwork, driving, computer work.   [] (86155) Provided verbal/tactile cueing for activities related to improving balance, coordination, kinesthetic sense, posture, motor skill, proprioception to assist with   [] LE / lumbar: LE, proximal hip, and core control in self care, mobility, lifting, ambulation and eccentric single leg control. [] UE / cervical: cervical, scapular, scapulothoracic and UE control with self care, reaching, carrying, lifting, house/yardwork, driving, computer work.   [] (10263) Therapist is in constant attendance of 2 or more patients providing skilled therapy interventions, but not providing any significant amount of measurable one-on-one time to either patient, for improvements in  [] LE / lumbar: LE, proximal hip, and core control in self care, mobility, lifting, ambulation and eccentric single leg control. [] UE / cervical: cervical, scapular, scapulothoracic and UE control with self care, reaching, carrying, lifting, house/yardwork, driving, computer work.      NMR and Therapeutic Activities:    [] (73168 or 38702) Provided verbal/tactile cueing for activities related to improving balance, coordination, kinesthetic sense, posture, motor skill, proprioception and motor activation to allow for proper function of   [] LE: / Lumbar core, proximal hip and LE with self care and ADLs  [] UE / Cervical: cervical, postural, scapular, scapulothoracic and UE control with self care, carrying, lifting, driving, computer work.   [] (14335) Gait Re-education- Provided training and instruction to the patient for proper LE, core and proximal hip recruitment and positioning and eccentric body weight control with ambulation re-education including up and down stairs     Home Management Training / Self Care:  [] (40954) Provided self-care/home management training related to activities of daily living and compensatory training, and/or use of adaptive equipment for improvement with: ADLs and compensatory training, meal preparation, safety []? Progressing: []? Met: []? Not Met: []? Adjusted  5. Pt will demonstrate ability to ambulate > 30 mins continuously without onset of symptoms    []? Progressing: []? Met: []? Not Met: []? Adjusted      Overall Progression Towards Functional goals/ Treatment Progress Update:  [] Patient is progressing as expected towards functional goals listed. [] Progression is slowed due to complexities/Impairments listed. [] Progression has been slowed due to co-morbidities. [x] Plan just implemented, too soon to assess goals progression <30days   [] Goals require adjustment due to lack of progress  [] Patient is not progressing as expected and requires additional follow up with physician  [] Other    Persisting Functional Limitations/Impairments:  []Sleeping [x]Sitting               [x]Standing []Transfers        [x]Walking []Kneeling               []Stairs []Squatting / bending   [x]ADLs []Reaching  []Lifting  []Housework  []Driving []Job related tasks  [x]Sports/Recreation []Other:        ASSESSMENT:   Pt has difficulty assuming improved cervicothoracic posture despite frequent verbal & manual cues. Treatment/Activity Tolerance:  [x] Patient able to complete tx  [] Patient limited by fatigue  [] Patient limited by pain  [] Patient limited by other medical complications  [] Other:     Prognosis: [x] Good [] Fair  [] Poor    Patient Requires Follow-up: [x] Yes  [] No    Plan for next treatment session:  Progress posture/HEP education, manual therapy, sciatic nerve flossing/traction as needed     PLAN: See mandy. PT 1x / week for 5 weeks. [x] Continue per plan of care [] Alter current plan (see comments)  [] Plan of care initiated [] Hold pending MD visit [] Discharge    Electronically signed by: Juan Carlos Pineda PT    Note: If patient does not return for scheduled/ recommended follow up visits, this note will serve as a discharge from care along with most recent update on progress.

## 2020-06-16 ENCOUNTER — HOSPITAL ENCOUNTER (OUTPATIENT)
Dept: PHYSICAL THERAPY | Age: 55
Setting detail: THERAPIES SERIES
Discharge: HOME | End: 2020-06-16
Payer: COMMERCIAL

## 2020-06-16 PROCEDURE — 97140 MANUAL THERAPY 1/> REGIONS: CPT

## 2020-06-16 PROCEDURE — 97110 THERAPEUTIC EXERCISES: CPT

## 2020-06-16 PROCEDURE — 97112 NEUROMUSCULAR REEDUCATION: CPT

## 2020-06-16 NOTE — FLOWSHEET NOTE
[address] [facility] Physical Therapy  Phone: [phone]   Fax: [phone]    Physical Therapy Daily Treatment Note  Date:  2020    Patient Name:  Kenroy Martinez    :  1965  MRN: [id number]  Medical/Treatment Diagnosis Information:  · Diagnosis: Thoracic Spondylosis, Thoracic DDD, Mid back pain   · Treatment Diagnosis: Impaired posture, decreased thoracic stability, decreased left hip strength/flexibility, sciatic nerve irritation  Insurance/Certification information:  PT Insurance Information: [place] 60 visits/year  Physician Information:  Referring Practitioner: MILAGROS Lockwood  Plan of care signed (Y/N): []  Yes [x]  No     Date of Patient follow up with Physician:      Progress Report: []  Yes  [x]  No     Date Range for reporting period:  Beginnin/26    Ending:     Progress report due (10 Rx/or 30 days whichever is less): visit #39     Recertification due (POC duration/ or 90 days whichever is less):     Visit # Insurance Allowable Auth required? Date Range   3/6 60, but after  needs f/u with MD per MD order  []  Yes  [x]  No      Latex Allergy:  [x]NO      []YES  Preferred Language for Healthcare:   [x]English       []other:    Functional Scale:        Date assessed:  LEFS: raw score = 12/80; dysfunction = 24%       Pain level:    3/10 middle back    SUBJECTIVE:  Said he's going to be in overtime for the not too distant future, so will have to be late for most visits for a while. Says the back area is OK, definitely more aware of posture, which seems to help, but still bothers him. Says overall about the same as when starting therapy. OBJECTIVE:     Pt 20 mins late d/t working overtime. Called earlier in day to confirm it was OK.      .   Pt 16 mins late for appt, called early in day to state he worked till 3 pm as  at [employer] and his appt was at 3 pm, so was aware ahead of time he would be late       RESTRICTIONS/PRECAUTIONS: cueing for activities related to strengthening, flexibility, endurance, ROM for improvements in  [x] LE / Lumbar: LE, proximal hip, and core control with self care, mobility, lifting, ambulation. [x] UE / Cervical: cervical, postural, scapular, scapulothoracic and UE control with self care, reaching, carrying, lifting, house/yardwork, driving, computer work.  [] (03009) Provided verbal/tactile cueing for activities related to improving balance, coordination, kinesthetic sense, posture, motor skill, proprioception to assist with   [] LE / lumbar: LE, proximal hip, and core control in self care, mobility, lifting, ambulation and eccentric single leg control. [] UE / cervical: cervical, scapular, scapulothoracic and UE control with self care, reaching, carrying, lifting, house/yardwork, driving, computer work.   [] (18336) Therapist is in constant attendance of 2 or more patients providing skilled therapy interventions, but not providing any significant amount of measurable one-on-one time to either patient, for improvements in  [] LE / lumbar: LE, proximal hip, and core control in self care, mobility, lifting, ambulation and eccentric single leg control. [] UE / cervical: cervical, scapular, scapulothoracic and UE control with self care, reaching, carrying, lifting, house/yardwork, driving, computer work.      NMR and Therapeutic Activities:    [] (71213 or 21004) Provided verbal/tactile cueing for activities related to improving balance, coordination, kinesthetic sense, posture, motor skill, proprioception and motor activation to allow for proper function of   [] LE: / Lumbar core, proximal hip and LE with self care and ADLs  [] UE / Cervical: cervical, postural, scapular, scapulothoracic and UE control with self care, carrying, lifting, driving, computer work.   [] (30646) Gait Re-education- Provided training and instruction to the patient for proper LE, core and proximal hip recruitment and positioning and eccentric body weight control with ambulation re-education including up and down stairs     Home Management Training / Self Care:  [] (12992) Provided self-care/home management training related to activities of daily living and compensatory training, and/or use of adaptive equipment for improvement with: ADLs and compensatory training, meal preparation, safety procedures and instruction in use of adaptive equipment, including bathing, grooming, dressing, personal hygiene, basic household cleaning and chores. Home Exercise Program:    [x] (05552) Reviewed/Progressed HEP activities related to strengthening, flexibility, endurance, ROM of   [] LE / Lumbar: core, proximal hip and LE for functional self-care, mobility, lifting and ambulation/stair navigation   [] UE / Cervical: cervical, postural, scapular, scapulothoracic and UE control with self care, reaching, carrying, lifting, house/yardwork, driving, computer work  [] (66337)Reviewed/Progressed HEP activities related to improving balance, coordination, kinesthetic sense, posture, motor skill, proprioception of   [] LE: core, proximal hip and LE for self care, mobility, lifting, and ambulation/stair navigation    [] UE / Cervical: cervical, postural,  scapular, scapulothoracic and UE control with self care, reaching, carrying, lifting, house/yardwork, driving, computer work    Manual Treatments:  PROM / STM / Oscillations-Mobs:  G-I, II, III, IV (PA's, Inf., Post.)  [] (64306) Provided manual therapy to mobilize LE, proximal hip and/or LS spine soft tissue/joints for the purpose of modulating pain, promoting relaxation,  increasing ROM, reducing/eliminating soft tissue swelling/inflammation/restriction, improving soft tissue extensibility and allowing for proper ROM for normal function with   [] LE / lumbar: self care, mobility, lifting and ambulation. [] UE / Cervical: self care, reaching, carrying, lifting, house/yardwork, driving, computer work. sciatic nerve flossing/traction as needed     PLAN: See eval. PT 1x / week for 5 weeks. [x] Continue per plan of care [] Alter current plan (see comments)  [] Plan of care initiated [] Hold pending MD visit [] Discharge    Electronically signed by: Faith Alvarado PT    Note: If patient does not return for scheduled/ recommended follow up visits, this note will serve as a discharge from care along with most recent update on progress.

## 2020-06-23 ENCOUNTER — APPOINTMENT (OUTPATIENT)
Dept: PHYSICAL THERAPY | Age: 55
End: 2020-06-23
Payer: COMMERCIAL

## 2020-06-30 ENCOUNTER — APPOINTMENT (OUTPATIENT)
Dept: PHYSICAL THERAPY | Age: 55
End: 2020-06-30
Payer: COMMERCIAL